# Patient Record
Sex: FEMALE | Race: WHITE | Employment: OTHER | URBAN - METROPOLITAN AREA
[De-identification: names, ages, dates, MRNs, and addresses within clinical notes are randomized per-mention and may not be internally consistent; named-entity substitution may affect disease eponyms.]

---

## 2017-07-12 ENCOUNTER — TRANSFERRED RECORDS (OUTPATIENT)
Dept: HEALTH INFORMATION MANAGEMENT | Facility: CLINIC | Age: 57
End: 2017-07-12

## 2017-08-17 ENCOUNTER — TRANSFERRED RECORDS (OUTPATIENT)
Dept: HEALTH INFORMATION MANAGEMENT | Facility: CLINIC | Age: 57
End: 2017-08-17

## 2018-01-27 ENCOUNTER — TRANSFERRED RECORDS (OUTPATIENT)
Dept: HEALTH INFORMATION MANAGEMENT | Facility: CLINIC | Age: 58
End: 2018-01-27

## 2018-04-27 ENCOUNTER — TRANSFERRED RECORDS (OUTPATIENT)
Dept: HEALTH INFORMATION MANAGEMENT | Facility: CLINIC | Age: 58
End: 2018-04-27

## 2018-05-01 ENCOUNTER — TRANSFERRED RECORDS (OUTPATIENT)
Dept: HEALTH INFORMATION MANAGEMENT | Facility: CLINIC | Age: 58
End: 2018-05-01

## 2018-05-08 ENCOUNTER — TRANSFERRED RECORDS (OUTPATIENT)
Dept: HEALTH INFORMATION MANAGEMENT | Facility: CLINIC | Age: 58
End: 2018-05-08

## 2018-05-08 NOTE — TELEPHONE ENCOUNTER
FUTURE VISIT INFORMATION      FUTURE VISIT INFORMATION:    Date: 05/16/18    Time: 830am    Location: [facility]  REFERRAL INFORMATION:    Referring provider:  KESHAWN BULLARD    Referring providers clinic: [facility]    Reason for visit/diagnosis  v/t/gtop/rnfl - Unexplained decrease vision consult    RECORDS REQUESTED FROM:       Clinic name Comments Records Status Imaging Status   [facility] Faxed Notes from the last year. [phone] Contact Number In HIM                                    RECORDS STATUS

## 2018-05-15 ENCOUNTER — HEALTH MAINTENANCE LETTER (OUTPATIENT)
Age: 58
End: 2018-05-15

## 2018-05-16 ENCOUNTER — OFFICE VISIT (OUTPATIENT)
Dept: OPHTHALMOLOGY | Facility: CLINIC | Age: 58
End: 2018-05-16
Payer: MEDICARE

## 2018-05-16 ENCOUNTER — PRE VISIT (OUTPATIENT)
Dept: OPHTHALMOLOGY | Facility: CLINIC | Age: 58
End: 2018-05-16

## 2018-05-16 DIAGNOSIS — H53.10 SUBJECTIVE VISUAL DISTURBANCE: ICD-10-CM

## 2018-05-16 DIAGNOSIS — H50.15 ALTERNATING EXOTROPIA: ICD-10-CM

## 2018-05-16 DIAGNOSIS — H53.10 SUBJECTIVE VISUAL DISTURBANCE: Primary | ICD-10-CM

## 2018-05-16 DIAGNOSIS — H53.40 VISUAL FIELD DEFECT: Primary | ICD-10-CM

## 2018-05-16 RX ORDER — ALBUTEROL SULFATE 90 UG/1
AEROSOL, METERED RESPIRATORY (INHALATION)
COMMUNITY
Start: 2017-01-01

## 2018-05-16 RX ORDER — HYDROCHLOROTHIAZIDE 25 MG/1
TABLET ORAL
COMMUNITY
Start: 2017-01-01

## 2018-05-16 RX ORDER — HYDROCODONE BITARTRATE AND ACETAMINOPHEN 10; 325 MG/1; MG/1
TABLET ORAL
COMMUNITY
Start: 2008-01-01

## 2018-05-16 RX ORDER — PRAVASTATIN SODIUM 40 MG
TABLET ORAL
COMMUNITY
Start: 2017-01-01

## 2018-05-16 RX ORDER — LAMOTRIGINE 100 MG/1
TABLET, ORALLY DISINTEGRATING ORAL
COMMUNITY
Start: 2007-01-01

## 2018-05-16 RX ORDER — LATANOPROST 50 UG/ML
SOLUTION/ DROPS OPHTHALMIC
COMMUNITY
Start: 2015-01-01

## 2018-05-16 RX ORDER — LOSARTAN POTASSIUM 100 MG/1
TABLET ORAL
COMMUNITY
Start: 2017-01-01

## 2018-05-16 RX ORDER — LEVOTHYROXINE SODIUM 200 UG/1
TABLET ORAL
COMMUNITY
Start: 1990-01-01

## 2018-05-16 RX ORDER — SERTRALINE HYDROCHLORIDE 100 MG/1
TABLET, FILM COATED ORAL
COMMUNITY
Start: 2017-11-01

## 2018-05-16 ASSESSMENT — REFRACTION_MANIFEST
OS_CYLINDER: +0.50
OS_SPHERE: -1.00
OS_AXIS: 035
OD_SPHERE: -1.00
OD_CYLINDER: SPHERE

## 2018-05-16 ASSESSMENT — EXTERNAL EXAM - LEFT EYE: OS_EXAM: NORMAL

## 2018-05-16 ASSESSMENT — REFRACTION_WEARINGRX
OD_CYLINDER: +0.50
OS_AXIS: 035
OS_CYLINDER: +0.50
OD_ADD: +2.50
SPECS_TYPE: BIFOCAL
OD_AXIS: 075
OS_SPHERE: -1.50
OS_ADD: +2.50
OD_SPHERE: -1.25

## 2018-05-16 ASSESSMENT — VISUAL ACUITY
OD_CC: J5
CORRECTION_TYPE: GLASSES
METHOD: SNELLEN - LINEAR
OS_PH_CC: 20/50
OD_CC: 20/50
OS_CC: 20/50
OD_CC+: -2
OS_CC: J5
OS_CC+: -1
OD_PH_CC: 20/50-1

## 2018-05-16 ASSESSMENT — EXTERNAL EXAM - RIGHT EYE: OD_EXAM: NORMAL

## 2018-05-16 ASSESSMENT — TONOMETRY
OD_IOP_MMHG: 18
IOP_METHOD: ICARE
OS_IOP_MMHG: 17

## 2018-05-16 ASSESSMENT — CUP TO DISC RATIO
OS_RATIO: 0.6
OD_RATIO: 0.6

## 2018-05-16 ASSESSMENT — SLIT LAMP EXAM - LIDS
COMMENTS: NORMAL
COMMENTS: NORMAL

## 2018-05-16 ASSESSMENT — CONF VISUAL FIELD
OS_NORMAL: 1
OD_NORMAL: 1
METHOD: COUNTING FINGERS

## 2018-05-16 ASSESSMENT — REFRACTION_FINALRX
OD_HPRISM: 6BI
OS_HPRISM: 6BI

## 2018-05-16 NOTE — PROGRESS NOTES
Assessment & Plan     Linsey Mcdaniels is a 58 year old female with the following diagnoses:   1. Visual field defect    2. Subjective visual disturbance    3. Alternating exotropia         Ms. Mcdaniels is a 58 year old with a 6 month history of blurry vision in both eyes with difficulty focusing.    She has had binocular horizontal diplopia and blurry vision in both eyes for the past 6 months.  She was not given any new glasses, and had a suggestion of some visual field constriction in both eyes.  No new head trauma.    She has had prism in her glasses for the past two years.  She finds her prism glasses are no longer working for her.  She denies headaches, but has significant fatigue over the past few months.  She has had numbness in the right side of her face for the past few months following a mild whiplash trauma.  She has had significant blood pressure fluctuations over the past few months.    In 2015, she had fallen and was down for 7 hours and hospitalized for rhabdomyolysis.  She has a history of Klippel-Feil syndrome as well as generalized ataxia.  She also has a history of spina bifida occulta, as well as spinal cord compression.  She also has type 2 diabetes mellitus, asthma, FACUNDO, long QT syndrome, fibromyalgia, hypothyroidism and gastric ulcers.    She has a history of open angle glaucoma and takes latanoprost drops.  She has a history of intermittent exotropia.    Her MRI revealed multilevel cervical spondylosis with no active cord compression with normal appearance of the trigeminal nerve bilaterally and essentially a normal MRI brain aside from punctate nonspecific white matter T2 gliosis at the left superior frontal gyrus.    On examination, her vision is best corrected to 20/30 in the right and 20/25 in the left with no relative afferent pupillary defect.  Her intraocular pressures were 18 in the right and 17 in the left.  She has an early Posterior subcapsular cataract (PSC) in the right  eye and nuclear sclerosis bilaterally.  She has no TIDs.  She has some mild cupping (0.6 and 0.5) in the right and left eye respectively.  She has very slow saccades and saccadic pursuit with some saccadic intrusions.  She has an exotropia worse at near than at distance suggesting convergence insufficiency.    Her visual fields show peripheral constriction in both eyes with a normal central island.  Her OCT is unremarkable apart from some mild thinning temporally in the left eye.  She does, however, have normal visual fields to confrontation.    It is my impression that Ms. 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  I do not think her visual fields constriction in both eyes is related to a glaucomatous or neurologic process given the unremarkable optical coherence tomography and normal confrontational visual fields.  She is also not symptomatic of visual field constriction.  Could consider adding more prism to glasses or a surgical procedure. Patient would like to increase the prism.  Follow up 1 year sooner as needed for worsening symptoms.           Attending Physician Attestation:  Complete documentation of historical and exam elements from today's encounter can be found in the full encounter summary report (not reduplicated in this progress note).  I personally obtained the chief complaint(s) and history of present illness.  I confirmed and edited as necessary the review of systems, past medical/surgical history, family history, social history, and examination findings as documented by others; and I examined the patient myself.  I personally reviewed the relevant tests, images, and reports as documented above.  I formulated and edited as necessary the assessment and plan and discussed the findings and management plan with the patient and family. - Tyrell Mccoy MD

## 2018-05-16 NOTE — LETTER
2018         RE:  :  MRN: Linsey Mcdaniels  1960  [id number]     Dear Dr. Parekh,    Thank you for asking me to see your very pleasant patient, Linsey Mcdaniels, in neuro-ophthalmic consultation.  I would like to thank you for sending your records and I have summarized them in the history of present illness.   My assessment and plan are below.  For further details, please see my attached clinic note.          Assessment & Plan     Linsey Mcdaniels is a 58 year old female with the following diagnoses:   1. Visual field defect    2. Subjective visual disturbance    3. Alternating exotropia         Ms. Mcdaniels is a 58 year old with a 6 month history of blurry vision in both eyes with difficulty focusing.    She has had binocular horizontal diplopia and blurry vision in both eyes for the past 6 months.  She was not given any new glasses, and had a suggestion of some visual field constriction in both eyes.  No new head trauma.    She has had prism in her glasses for the past two years.  She finds her prism glasses are no longer working for her.  She denies headaches, but has significant fatigue over the past few months.  She has had numbness in the right side of her face for the past few months following a mild whiplash trauma.  She has had significant blood pressure fluctuations over the past few months.    In , she had fallen and was down for 7 hours and hospitalized for rhabdomyolysis.  She has a history of Klippel-Feil syndrome as well as generalized ataxia.  She also has a history of spina bifida occulta, as well as spinal cord compression.  She also has type 2 diabetes mellitus, asthma, FACUNDO, long QT syndrome, fibromyalgia, hypothyroidism and gastric ulcers.    She has a history of open angle glaucoma and takes latanoprost drops.  She has a history of intermittent exotropia.    Her MRI revealed multilevel cervical spondylosis with no active cord compression with normal appearance of the trigeminal  nerve bilaterally and essentially a normal MRI brain aside from punctate nonspecific white matter T2 gliosis at the left superior frontal gyrus.    On examination, her vision is best corrected to 20/30 in the right and 20/25 in the left with no relative afferent pupillary defect.  Her intraocular pressures were 18 in the right and 17 in the left.  She has an early Posterior subcapsular cataract (PSC) in the right eye and nuclear sclerosis bilaterally.  She has no TIDs.  She has some mild cupping (0.6 and 0.5) in the right and left eye respectively.  She has very slow saccades and saccadic pursuit with some saccadic intrusions.  She has an exotropia worse at near than at distance suggesting convergence insufficiency.    Her visual fields show peripheral constriction in both eyes with a normal central island.  Her OCT is unremarkable apart from some mild thinning temporally in the left eye.  She does, however, have normal visual fields to confrontation.    It is my impression that Ms. 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  I do not think her visual fields constriction in both eyes is related to a glaucomatous or neurologic process given the unremarkable optical coherence tomography and normal confrontational visual fields.  She is also not symptomatic of visual field constriction.  Could consider adding more prism to glasses or a surgical procedure. Patient would like to increase the prism.  Follow up 1 year sooner as needed for worsening symptoms.          Again, thank you for allowing me to participate in the care of your patient.      Sincerely,    Tyrell Mccoy MD  Professor, Neuro-Ophthalmology  Department of Ophthalmology and Visual Neurosciences  [facility]    CC: Jt Parekh OD  [facility]  [address] [address]  VIA Facsimile:  [phone]     Michelle Sherwood MD  [facility] [address]  [place] [address]  VIA Facsimile: [phone]

## 2018-05-16 NOTE — MR AVS SNAPSHOT
After Visit Summary   5/16/2018    Linsey Mcdaniels    MRN: [id number]           Patient Information     Date Of Birth          1960        Visit Information        Provider Department      5/16/2018 8:30 AM Tyrell Mccoy MD [facility]        Today's Diagnoses     Visual field defect    -  1    Subjective visual disturbance        Alternating exotropia           Follow-ups after your visit        Who to contact     Please call your clinic at [phone] to:    Ask questions about your health    Make or cancel appointments    Discuss your medicines    Learn about your test results    Speak to your doctor            Additional Information About Your Visit        MyChart Information     [employer] gives you secure access to your electronic health record. If you see a primary care provider, you can also send messages to your care team and make appointments. If you have questions, please call your primary care clinic.  If you do not have a primary care provider, please call [phone] and they will assist you.      [employer] is an electronic gateway that provides easy, online access to your medical records. With [employer], you can request a clinic appointment, read your test results, renew a prescription or communicate with your care team.     To access your existing account, please contact your [facility] or call [phone] for assistance.        Care EveryWhere ID     This is your Care EveryWhere ID. This could be used by other organizations to access your [place] medical records  [id number]         Blood Pressure from Last 3 Encounters:   No data found for BP    Weight from Last 3 Encounters:   No data found for Wt              We Performed the Following     Color Vision - Screening OU (both eyes)     Glaucoma Top OU     IOP Measurement     OCT Optic Nerve RNFL Spectralis OU (both eyes)     Sensorimotor       Information about OPIOIDS      PRESCRIPTION OPIOIDS: WHAT YOU NEED TO KNOW   You have a prescription for an opioid (narcotic) pain medicine. Opioids can cause addiction. If you have a history of chemical dependency of any type, you are at a higher risk of becoming addicted to opioids. Only take this medicine after all other options have been tried. Take it for as short a time and as few doses as possible.     Do not:    Drive. If you drive while taking these medicines, you could be arrested for driving under the influence (DUI).    Operate heavy machinery    Do any other dangerous activities while taking these medicines.     Drink any alcohol while taking these medicines.      Take with any other medicines that contain acetaminophen. Read all labels carefully. Look for the word  acetaminophen  or  Tylenol.  Ask your pharmacist if you have questions or are unsure.    Store your pills in a secure place, locked if possible. We will not replace any lost or stolen medicine. If you don t finish your medicine, please throw away (dispose) as directed by your pharmacist. The [place] Pollution Control Agency has more information about safe disposal: https://www.pca.[facility].[place].us/living-green/managing-unwanted-medications    All opioids tend to cause constipation. Drink plenty of water and eat foods that have a lot of fiber, such as fruits, vegetables, prune juice, apple juice and high-fiber cereal. Take a laxative (Miralax, milk of magnesia, Colace, Senna) if you don t move your bowels at least every other day.          Primary Care Provider Office Phone # Fax #    Michelle Sherwood [phone]       [facility] [address]  [facility] [address]        Equal Access to Services     [facility]: Hadii aad edgar hadasho Soomaali, waaxda luqadaha, qaybta kaalmada adejoann canchola . So [facility] [phone].    ATENCIÓN: Si habla español, tiene a gore disposición servicios gratuitos de  asistencia lingüística. Krystle al [phone].    We comply with applicable federal civil rights laws and [place] laws. We do not discriminate on the basis of race, color, national origin, age, disability, sex, sexual orientation, or gender identity.            Thank you!     Thank you for choosing [facility]  for your care. Our goal is always to provide you with excellent care. Hearing back from our patients is one way we can continue to improve our services. Please take a few minutes to complete the written survey that you may receive in the mail after your visit with us. Thank you!             Your Updated Medication List - Protect others around you: Learn how to safely use, store and throw away your medicines at www.disposemymeds.org.          This list is accurate as of 5/16/18 10:14 AM.  Always use your most recent med list.                   Brand Name Dispense Instructions for use Diagnosis    CYCLOBENZAPRINE COMFORT PAC 10 MG Kit           hydrochlorothiazide 25 MG tablet    HYDRODIURIL          HYDROcodone-acetaminophen  MG per tablet    NORCO          lamoTRIgine 100 MG Tbdp ODT tab    LaMICtal          latanoprost 0.005 % ophthalmic solution    XALATAN          levothyroxine 200 MCG tablet    SYNTHROID/LEVOTHROID          losartan 100 MG tablet    COZAAR          omeprazole 20 MG CR capsule    priLOSEC          pravastatin 40 MG tablet    PRAVACHOL          ranitidine 150 MG tablet    ZANTAC          sertraline 100 MG tablet    ZOLOFT          VENTOLIN  (90 Base) MCG/ACT Inhaler   Generic drug:  albuterol

## 2018-05-16 NOTE — NURSING NOTE
Chief Complaints and History of Present Illnesses   Patient presents with     Consult For     unexplained decreased vision     HPI    Affected eye(s):  Both   Symptoms:     Blurred vision   Decreased vision      Frequency:  Constant       Do you have eye pain now?:  No      Comments:  Patient states has constant blurry vision with or without glasses- started in November/December 2017. Pt feels like her eyes roll back too.   Latanoprost QHS OU.    Everton Gonzalez COT 7:43 AM May 16, 2018

## 2019-10-04 ENCOUNTER — HEALTH MAINTENANCE LETTER (OUTPATIENT)
Age: 59
End: 2019-10-04

## 2019-11-08 ENCOUNTER — MEDICAL CORRESPONDENCE (OUTPATIENT)
Dept: HEALTH INFORMATION MANAGEMENT | Facility: CLINIC | Age: 59
End: 2019-11-08

## 2019-11-08 ENCOUNTER — TRANSFERRED RECORDS (OUTPATIENT)
Dept: HEALTH INFORMATION MANAGEMENT | Facility: CLINIC | Age: 59
End: 2019-11-08

## 2019-12-10 ENCOUNTER — OFFICE VISIT (OUTPATIENT)
Dept: OPHTHALMOLOGY | Facility: CLINIC | Age: 59
End: 2019-12-10
Attending: OPHTHALMOLOGY
Payer: MEDICARE

## 2019-12-10 DIAGNOSIS — H53.2 DOUBLE VISION: ICD-10-CM

## 2019-12-10 DIAGNOSIS — H50.05 ESOTROPIA, ALTERNATING: ICD-10-CM

## 2019-12-10 DIAGNOSIS — H53.10 SUBJECTIVE VISUAL DISTURBANCE: ICD-10-CM

## 2019-12-10 DIAGNOSIS — H53.2 DOUBLE VISION: Primary | ICD-10-CM

## 2019-12-10 PROCEDURE — 92060 SENSORIMOTOR EXAMINATION: CPT | Mod: ZF | Performed by: OPHTHALMOLOGY

## 2019-12-10 PROCEDURE — 36415 COLL VENOUS BLD VENIPUNCTURE: CPT | Performed by: OPHTHALMOLOGY

## 2019-12-10 PROCEDURE — 84425 ASSAY OF VITAMIN B-1: CPT | Performed by: OPHTHALMOLOGY

## 2019-12-10 PROCEDURE — 83519 RIA NONANTIBODY: CPT | Performed by: OPHTHALMOLOGY

## 2019-12-10 PROCEDURE — G0463 HOSPITAL OUTPT CLINIC VISIT: HCPCS | Mod: 25,ZF

## 2019-12-10 ASSESSMENT — CUP TO DISC RATIO
OS_RATIO: 0.6
OD_RATIO: 0.5

## 2019-12-10 ASSESSMENT — VISUAL ACUITY
OS_CC: 20/40
METHOD: SNELLEN - LINEAR
OD_CC: 20/40
CORRECTION_TYPE: GLASSES
OD_CC+: +2

## 2019-12-10 ASSESSMENT — REFRACTION_WEARINGRX
OD_SPHERE: -1.00
OD_ADD: +2.50
OS_SPHERE: -1.00
OD_AXIS: 075
OD_HPRISM: 6 BI
OD_CYLINDER: +0.50
OS_AXIS: 030
OS_ADD: +2.50
OS_CYLINDER: +0.75
OS_HPRISM: 6 BI
SPECS_TYPE: BIFOCAL

## 2019-12-10 ASSESSMENT — CONF VISUAL FIELD
OS_INFERIOR_NASAL_RESTRICTION: 3
OD_NORMAL: 1
METHOD: COUNTING FINGERS
OS_INFERIOR_TEMPORAL_RESTRICTION: 3

## 2019-12-10 ASSESSMENT — TONOMETRY
OS_IOP_MMHG: 14
IOP_METHOD: ICARE-AS
OD_IOP_MMHG: 14

## 2019-12-10 ASSESSMENT — EXTERNAL EXAM - LEFT EYE: OS_EXAM: NORMAL

## 2019-12-10 ASSESSMENT — EXTERNAL EXAM - RIGHT EYE: OD_EXAM: NORMAL

## 2019-12-10 ASSESSMENT — SLIT LAMP EXAM - LIDS
COMMENTS: NORMAL
COMMENTS: NORMAL

## 2019-12-10 NOTE — PROGRESS NOTES
1. 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  Will proceed with work-up to prove diagnosis before initiating treatment.      2. Open angle glaucoma- treatment as per Dr. Parekh.    Patient is a very pleasant 59 year old female sent by Dr. Parekh for consultation of possible myasthenia gravis.     Today's history was facilitated by the patient's daughter as well as a review of the medical record here at the [facility] including an office visit with Dr. Tyrell Mccoy in May 2018.  At that time the patient was evaluated for intermittent binocular diplopia for the past 6 months.  She had a history of wearing prism in her glasses for 2 years prior to that visit.  At the visit with Dr. Mccoy she described difficulty with double vision despite her prism glasses which were no longer working for her.  Automated visual fields showed peripheral constriction in both eyes.  These findings were not appreciated on the confrontation testing.    Sensorimotor exam performed in May 2018 demonstrated an exotropia of 12 prism diopters in primary gaze that did decreased in right and left gaze to around 6-7.  And downgaze it measured 12 prism diopters in upgaze 20 prism diopters.  At near there was a 30 prism diopter exotropia.    Today the patient reports that over the past 6 months she feels like her prism glasses are again no longer working for her.  The patient's daughter notices at least several years of intermittent droopy eyelid.  This is typically bilateral.  In the past year she is also noted difficulty with swallowing that she believes has gotten worse.  She has times where she feels like food gets stuck and she is unable to swallow.    Past medical history is significant for Kippel Feil syndrome, ataxia, gastric bypass with complications in 2007.  She had a bleeding peptic ulcer in 2010 status post blood transfusions.    She is being treated for glaucoma and has had cataract  surgeyr    Visual acuity is 20/40 in both eyes with present correction. Intraocular pressure is normal in both eyes. Pupils normal without afferent pupillary defect. Color plates 10/11 in the right eye and 9/11 in the left eye. Confrontational visual fields show inferior defect in the left eye. Motility variable throughout examination today. No fatigue with upgaze testing. Slit lamp exam with mild cataract in both eyes. Dilated fundus exam with mild cupping in both eyes.     It is my impression that Linsey has variable eye alignment. I will obtain lab testing for myasthenia gravis and also thiamine testing. 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 Check acetylcholine receptor binding antibody.  If negative then MUSK and LRP4 antibody. If negative then single fiber electromyography testing.  Will check thiamine level also as thiamine deficiency could present in this fashion though this would be much less likely.  Recommend start b complex twice daily and multivitamin twice daily.    Primary care physician is Idalia Allen [facility]  [place], [place]     I spent a total of 60 minutes face to face with Linsey Mcdaniels during today's office visit.  Over 50% of this time was spent counseling the patient and/or coordinating care regarding her variable strabismus and possible myasthenia gravis.    Complete documentation of historical and exam elements from today's encounter can be found in the full encounter summary report (not reduplicated in this progress note).  I personally obtained the chief complaint(s) and history of present illness.  I confirmed and edited as necessary the review of systems, past medical/surgical history, family history, social history, and examination findings as documented by others; and I examined the patient myself.  I personally reviewed the relevant tests, images, and  reports as documented above.  I formulated and edited as necessary the assessment and plan and discussed the findings and management plan with the patient and family     Noe Gonzales MD

## 2019-12-10 NOTE — LETTER
December 10, 2019    RE: Linsey Mcdaniels  : 1960  MRN: [id number]    Dear Dr. Parekh,    Thank you for referring your patient, Linsey Mcdaniels, to my neuro-ophthalmology clinic recently.  After a thorough neuro-ophthalmic history and examination, I came to the following conclusions:   1. 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  Will proceed with work-up to prove diagnosis before initiating treatment.      2. Open angle glaucoma- treatment as per Dr. Parekh.    Patient is a very pleasant 59 year old female sent by Dr. Parekh for consultation of possible myasthenia gravis.     Today's history was facilitated by the patient's daughter as well as a review of the medical record here at the [facility] including an office visit with Dr. Tyrell Mccoy in May 2018.  At that time the patient was evaluated for intermittent binocular diplopia for the past 6 months.  She had a history of wearing prism in her glasses for 2 years prior to that visit.  At the visit with Dr. Mccoy she described difficulty with double vision despite her prism glasses which were no longer working for her.  Automated visual fields showed peripheral constriction in both eyes.  These findings were not appreciated on the confrontation testing.    Sensorimotor exam performed in May 2018 demonstrated an exotropia of 12 prism diopters in primary gaze that did decreased in right and left gaze to around 6-7.  And downgaze it measured 12 prism diopters in upgaze 20 prism diopters.  At near there was a 30 prism diopter exotropia.    Today the patient reports that over the past 6 months she feels like her prism glasses are again no longer working for her.  The patient's daughter notices at least several years of intermittent droopy eyelid.  This is typically bilateral.  In the past year she is also noted difficulty with swallowing that she believes has gotten worse.  She has times where she feels like  food gets stuck and she is unable to swallow.    Past medical history is significant for Kippel Feil syndrome, ataxia, gastric bypass with complications in 2007.  She had a bleeding peptic ulcer in 2010 status post blood transfusions.    She is being treated for glaucoma and has had cataract surgeyr    Visual acuity is 20/40 in both eyes with present correction. Intraocular pressure is normal in both eyes. Pupils normal without afferent pupillary defect. Color plates 10/11 in the right eye and 9/11 in the left eye. Confrontational visual fields show inferior defect in the left eye. Motility variable throughout examination today. No fatigue with upgaze testing. Slit lamp exam with mild cataract in both eyes. Dilated fundus exam with mild cupping in both eyes.     It is my impression that Linsey has variable eye alignment. I will obtain lab testing for myasthenia gravis and also thiamine testing. 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 Check acetylcholine receptor binding antibody.  If negative then MUSK and LRP4 antibody. If negative then single fiber electromyography testing.  Will check thiamine level also as thiamine deficiency could present in this fashion though this would be much less likely.  Recommend start b complex twice daily and multivitamin twice daily.    Primary care physician is Idalia Allen [facility]  [place], [place]    Again, thank you for trusting me with the care of your patient.  For further exam details, please feel free to contact our office for additional records.  If you wish to contact me regarding this patient please email me at [email or web address] or give my clinic a call to arrange a phone conversation.    Sincerely,    Noe Gonzales MD  , Neuro-Ophthalmology and Adult Strabismus Surgery  The Kenny Higgins Chair in  Neuro-Ophthalmology  Department of Ophthalmology and Visual Neurosciences  [facility]    DX: possible myasthenia gravis

## 2019-12-10 NOTE — NURSING NOTE
Chief Complaint(s) and History of Present Illness(es)     Myasthenia Gravis               Comments     Inf: Patient and daughter. Referred from Dr. Parekh at [facility] for MG. She also has glaucoma (takes latanoprost QPM), diabetes, hypothyroidism, and cataracts. For the past 6 months she has had double vision and has 12 BI in her glasses (started with 6BI, but didn't help until 12). Helped for first 2 months and now double vision is the same with or without glasses. When diplopia occurs, images are side by side and slanted. No pain, redness, or irritation. Eyes drift out all the time, worse without gls. Has trouble seeing in dn and nr. Squints frequently. Tilts head to the left, double vision improves.  Was told previously she has symptoms of bells palsy (left side of face droops). Has had many MRIs and surgeries. Most recent CT at [facility] in [place], had MRI several years ago. May have had recent blood work with TSH, but unsure what other lab work was done.   Daughter has noticed for a few years that her lids become droopy like she is sleeping.

## 2019-12-12 LAB — ACHR BIND AB SER-SCNC: 0 NMOL/L (ref 0–0.4)

## 2019-12-13 LAB — VIT B1 BLD-MCNC: 93 NMOL/L (ref 70–180)

## 2019-12-17 ENCOUNTER — TELEPHONE (OUTPATIENT)
Dept: OPHTHALMOLOGY | Facility: CLINIC | Age: 59
End: 2019-12-17

## 2019-12-17 NOTE — TELEPHONE ENCOUNTER
----- Message from Noe Gonzales MD sent at 12/15/2019  1:51 PM CST -----  Regarding: Please call Daughter or Patient  Patricia,    Please call daughter or patient and tell her that her labs for myasthenia gravis came back negative but that I remain very suspicious for myasthenia gravis.  The first test I ordered only catches about 50% of myasthenia gravis cases.  We will run two additional lab tests for myasthenia gravis.  These can take up to 4 weeks to come back.  Once these come back if normal then we will order single fiber electromyography.  If these other tests come back abnormal then we will start treatment for myasthenia gravis.    Thank you. - Noe

## 2019-12-18 NOTE — TELEPHONE ENCOUNTER
12/17/2019 03:43 PM Phone (Outgoing) Linsey Mcdaniels (Self) [phone] (H)    Left Message - called and left message with MD message and call back # -e m       By Nuha Orellana      12/17/2019 03:45 PM Phone (Outgoing) JAREK,SANDRO (EC) [phone]    Left Message - left message for patient's daughter with info about labs and call back #      By Nuha Orellana      12/18/2019 09:23 AM Phone (Incoming) Linsey Mcdaniels (Self) [phone] (H)    Patient called and requested we fax lab orders to facility. Provided contact # for Wesly at facility who states she will assist with lab coordination; ph: [phone]; fax: [phone]      By Nuha Orellana      12/18/2019 10:34 AM Phone (Outgoing) Linsey Mcdaniels (Self) [phone] (H)    Talked with Patient - Let Linsey know that I faxed lab orders but that I was still doubtful her home care lab facility would be able to coordinate send outs. Requested that if they expressed concerns that she NOT proceed with home draw & instead go to [facility] [place]. Patient expressed understanding -em 10:35 AM December 18, 2019

## 2020-01-10 DIAGNOSIS — H53.2 DOUBLE VISION: ICD-10-CM

## 2020-01-10 LAB — MISCELLANEOUS TEST: NORMAL

## 2020-01-10 PROCEDURE — 99000 SPECIMEN HANDLING OFFICE-LAB: CPT | Performed by: OPHTHALMOLOGY

## 2020-01-10 PROCEDURE — 36415 COLL VENOUS BLD VENIPUNCTURE: CPT | Performed by: OPHTHALMOLOGY

## 2020-01-10 PROCEDURE — 83519 RIA NONANTIBODY: CPT | Mod: 90 | Performed by: OPHTHALMOLOGY

## 2020-01-13 LAB — MUSCLE SPECIFIC KINASE (MUSK) ABY IGG: 0 NMOL/L (ref 0–0.03)

## 2020-01-17 ENCOUNTER — TELEPHONE (OUTPATIENT)
Dept: OPHTHALMOLOGY | Facility: CLINIC | Age: 60
End: 2020-01-17

## 2020-01-17 NOTE — TELEPHONE ENCOUNTER
"Conveyed MD message to patient and daughter      \"Please call this patient (or daughter) and tell her that the normal blood test does not prove she does not have myasthenia gravis because many patients with myasthenia gravis will have a normal blood test.  I would like now to refer her to Dr. Ferrara for single fiber electromyography at [facility].  I told her this was the plan before if we could  not prove her diagnosis with a blood test.  There is still one blood test pending but probably better to get the single fiber electromyography scheduled.  I suspect the last blood test will come back normal also. \"  "

## 2020-01-20 LAB — LAB SCANNED RESULT: NORMAL

## 2020-01-21 DIAGNOSIS — H53.2 DOUBLE VISION: Primary | ICD-10-CM

## 2020-01-21 DIAGNOSIS — H50.9 STRABISMUS: ICD-10-CM

## 2020-02-08 ENCOUNTER — HEALTH MAINTENANCE LETTER (OUTPATIENT)
Age: 60
End: 2020-02-08

## 2020-02-13 ENCOUNTER — TELEPHONE (OUTPATIENT)
Dept: OPHTHALMOLOGY | Facility: CLINIC | Age: 60
End: 2020-02-13

## 2020-02-13 NOTE — TELEPHONE ENCOUNTER
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 Livingston to review swallowing difficulties and assist in referral to neurologist he previously recommended    Reviewed would ask Dr. Livingston/facilitator to assist and contact pt after complete    Reviewed to f/u with PCP/ENT on swallow study recommendations    Roman Merritt RN 10:48 AM 02/13/20          [facility] Call Center    Phone Message    May a detailed message be left on voicemail: yes     Reason for Call: Symptoms or Concerns     If patient has red-flag symptoms, warm transfer to triage line    Current symptom or concern: Patient is calling to discuss her new symptoms she is experiencing in regards to difficulting swallowing that has progressed. Patient would like to discuss this in more detail with his nurse Nuha. Please advise.         Symptoms have been present   Action Taken: Message routed to:  Other: Eye Clinic    Travel Screening: Not Applicable

## 2020-02-14 NOTE — TELEPHONE ENCOUNTER
Called patient to discuss trouble swallowing. She reports that she has gradually had increased trouble with solid foods.  She reports being seen by an ENT physician at her living facility and she will make an appointment to see another ENT with a swallow study.  This is what her ENT physician that saw her recommended.  I informed her that her diplopia has been going on for years and while I am suspicious for myasthenia gravis, she does not yet have a confirmed diagnosis.  I recommended she follow through with her ENT evaluation and swallow study.  I recommended she keep her single fiber electromyography appointment in March and that she see a cardiologist to get approval for mestinon 60 mg three times a day which would be my first line treatment for her (patient has extensive cardiac history but she was not clear what exactly was her diagnosis).  Prednisone would carry significant risk for this patient and I am reluctant to start it without a confirmed diagnosis of myasthenia gravis. I am hopeful that her single fiber electromyography will allow us to either diagnosis or rule-out myasthenia gravis.    She will keep her follow-up with me 3/3/20. Advised her that if she was unable to drink smoothies / liquids  then she must go to the [facility] emergency department for evaluation.

## 2020-02-17 ENCOUNTER — MEDICAL CORRESPONDENCE (OUTPATIENT)
Dept: HEALTH INFORMATION MANAGEMENT | Facility: CLINIC | Age: 60
End: 2020-02-17

## 2020-02-19 DIAGNOSIS — I49.9 CARDIAC ARRHYTHMIA: Primary | ICD-10-CM

## 2020-03-03 ENCOUNTER — OFFICE VISIT (OUTPATIENT)
Dept: OPHTHALMOLOGY | Facility: CLINIC | Age: 60
End: 2020-03-03
Attending: OPHTHALMOLOGY
Payer: MEDICARE

## 2020-03-03 DIAGNOSIS — H53.2 DOUBLE VISION: ICD-10-CM

## 2020-03-03 DIAGNOSIS — H53.10 SUBJECTIVE VISUAL DISTURBANCE: ICD-10-CM

## 2020-03-03 PROCEDURE — G0463 HOSPITAL OUTPT CLINIC VISIT: HCPCS | Mod: 25,ZF

## 2020-03-03 PROCEDURE — 92060 SENSORIMOTOR EXAMINATION: CPT | Mod: ZF | Performed by: OPHTHALMOLOGY

## 2020-03-03 ASSESSMENT — SLIT LAMP EXAM - LIDS
COMMENTS: NORMAL
COMMENTS: NORMAL

## 2020-03-03 ASSESSMENT — VISUAL ACUITY
OD_CC+: +2
CORRECTION_TYPE: GLASSES
METHOD: SNELLEN - LINEAR
OD_CC: 20/40
OS_CC: 20/30
OS_CC+: -2

## 2020-03-03 ASSESSMENT — REFRACTION_WEARINGRX
OD_SPHERE: -1.00
OD_HPRISM: 6 BI
OD_ADD: +2.50
OD_CYLINDER: +0.50
OS_HPRISM: 6 BI
SPECS_TYPE: BIFOCAL
OS_ADD: +2.50
OS_CYLINDER: +0.75
OS_SPHERE: -1.00
OD_AXIS: 075
OS_AXIS: 030

## 2020-03-03 ASSESSMENT — CONF VISUAL FIELD
OS_INFERIOR_TEMPORAL_RESTRICTION: 3
OS_INFERIOR_NASAL_RESTRICTION: 3
OD_NORMAL: 1
METHOD: COUNTING FINGERS

## 2020-03-03 ASSESSMENT — CUP TO DISC RATIO
OS_RATIO: 0.6
OD_RATIO: 0.5

## 2020-03-03 ASSESSMENT — TONOMETRY
OS_IOP_MMHG: 12
OD_IOP_MMHG: 14

## 2020-03-03 ASSESSMENT — EXTERNAL EXAM - RIGHT EYE: OD_EXAM: NORMAL

## 2020-03-03 ASSESSMENT — EXTERNAL EXAM - LEFT EYE: OS_EXAM: NORMAL

## 2020-03-03 NOTE — PROGRESS NOTES
1. 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  Negative acetylcholine receptor binding antibody, MuSK antibody, LRP4, and thiamine. She is having worsening dysphagia with a choking episode that the patient reports as being nearly life threatening. Recommend staying on liquid diet given choking episode. No evidence of active myasthenic crisis requiring hospitalization. Scheduled for sfEMG on 3/24/2020. ENT appointment 3/12/2020. Cardiology appointment 3/19/2020. If sfEMG positive then will start mestinon and prednisone with referral to neuromuscular specialist.    2. Open angle glaucoma- treatment as per Dr. Parekh.    Patient is a very pleasant 60 year old female here for follow up of suspected ocular myasthenia gravis.     Historical Data:    Sensorimotor exam performed in May 2018 demonstrated an exotropia of 12 prism diopters in primary gaze that did decreased in right and left gaze to around 6-7.  And downgaze it measured 12 prism diopters in upgaze 20 prism diopters.  At near there was a 30 prism diopter exotropia.    Past medical history is significant for Kippel Feil syndrome, ataxia, gastric bypass with complications in 2007, and Type 2 Diabetes diet controlled. She had a bleeding peptic ulcer in 2010 status post blood transfusions.    She is being treated for glaucoma and has had cataract surgery.    Interval History:    Today the patient reports that over the past 3 months since last visit 12/10/2019 she notes more blurry vision and binocular diagonal diplopia. She also notes worsening ptosis of both eyes requiring chin up head position and frontalis activation. She notes worsening of swallowing since the last visit and nearly choked on ham and bean soup. She reports that she is more short of breath as well. She drinks with a straw due to neck issues and has not had difficulty using the straw.    Visual acuity is 20/40 right eye and 20/30 in the left eye with  present correction. Intraocular pressure is normal in both eyes. Pupils normal without afferent pupillary defect. Color plates 11/11 in the right eye and 8/11 in the left eye. Confrontational visual fields show inferior defect in the left eye. Motility variable throughout examination today. Fatigue with upgaze testing, bilaterally weak orbicularis function. Weak flexion and extension of the neck; greater with extension. Slit lamp exam with mild cataract in both eyes.     It is my impression that Linsey has variable eye alignment secondary to myasthenia gravis. Her worsening swallowing is concerning for bulbar involvement. Lab testing negative to date. Scheduled for single fiber EMG given clinical suspicion. She is scheduled to see her cardiologist in preparation of possibly starting mestinon. Discussed staying on liquid diet and avoiding solid foods until her work up is completed as an outpatient. Discussed that if she has an episode of choking or increased difficulty breathing that she should seek emergency medical attention for possible myasthenic crisis. Follow up 2 months.     Complete documentation of historical and exam elements from today's encounter can be found in the full encounter summary report (not reduplicated in this progress note).  I personally obtained the chief complaint(s) and history of present illness.  I confirmed and edited as necessary the review of systems, past medical/surgical history, family history, social history, and examination findings as documented by others; and I examined the patient myself.  I personally reviewed the relevant tests, images, and reports as documented above.  I formulated and edited as necessary the assessment and plan and discussed the findings and management plan with the patient and family.  I personally reviewed the ophthalmic test(s) associated with this encounter, agree with the interpretation(s) as documented by the resident/fellow, and have edited the  corresponding report(s) as necessary.     MD Tyrell Doherty MD  Neuro-Ophthalmology Fellow

## 2020-03-03 NOTE — NURSING NOTE
Chief Complaint(s) and History of Present Illness(es)     Myasthenia Gravis               Comments     Inf: mom and patient. Distance vision blurry, but near vision is ok. Diplopia occurs several times a day, very variable. Usually occurs when trying to focus on something. Images are  at a slant. Has had trouble swallowing and eating since last visit. Unable to eat anything but soups. Lids feel heavier than LV. Feels like she favors the left side. No redness, tearing, or irritation. No photophobia.     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 She also has a history of spina bifida occulta, as well as spinal cord compression. She also has type 2 diabetes mellitus, asthma, FACUNDO, long QT syndrome, fibromyalgia, hypothyroidism and gastric ulcers.

## 2020-05-12 ENCOUNTER — TELEPHONE (OUTPATIENT)
Dept: OPHTHALMOLOGY | Facility: CLINIC | Age: 60
End: 2020-05-12

## 2020-05-15 ENCOUNTER — TELEPHONE (OUTPATIENT)
Dept: OPHTHALMOLOGY | Facility: CLINIC | Age: 60
End: 2020-05-15

## 2020-05-15 NOTE — TELEPHONE ENCOUNTER
I have given all the information you sent me to my Nurses here at [facility].  We will need them to help us set up a video visit with Dr Gonzales.   I want you to be able to contact my nurses at any time.  I want them to know what is going on.  My Nurses have educated themselves about MG.  They knew nothing about it and want to help me/us with this.  I also want my Health Care Provider to be in the loop.  [facility] [facility]:     Nurse Dora [phone]    [place] [phone]  Called and spoke with CUBA. She states patient has been ill (no eye symptoms, mostly GI). But Stable eye symptoms. No complaints of eye pain or double vision that she recalls. She will chat with patient (patient had mentioned it would be a video visit) explained that my instructions stated telephone was sufficient so if patient wanted to do video we could but otherwise telephone only was OK too. They will let me know if she is wanting video instead of telephone only. Provided direct line for follow up -em 11:00 AM May 15, 2020    [facility]  Health Care Provider:     Idalia Allen [phone]  Linsey Allen's assistant  Left voicemail to confirm they have receive records and requesting contact info/fax if they have not.   Linsey: [phone]

## 2020-05-22 ENCOUNTER — VIRTUAL VISIT (OUTPATIENT)
Dept: OPHTHALMOLOGY | Facility: CLINIC | Age: 60
End: 2020-05-22
Attending: OPHTHALMOLOGY
Payer: MEDICARE

## 2020-05-22 DIAGNOSIS — H53.2 DIPLOPIA: Primary | ICD-10-CM

## 2020-06-17 NOTE — PROGRESS NOTES
"Linsey Mcdaniels is a 60 year old female who is being evaluated via a billable telephone visit.      The patient has been notified of following:     \"This telephone visit will be conducted via a call between you and your physician/provider. We have found that certain health care needs can be provided without the need for a physical exam.  This service lets us provide the care you need with a short phone conversation.  If a prescription is necessary we can send it directly to your pharmacy.  If lab work is needed we can place an order for that and you can then stop by our lab to have the test done at a later time.    Telephone visits are billed at different rates depending on your insurance coverage. During this emergency period, for some insurers they may be billed the same as an in-person visit.  Please reach out to your insurance provider with any questions.    If during the course of the call the physician/provider feels a telephone visit is not appropriate, you will not be charged for this service.\"    Patient has given verbal consent for Telephone visit?  Yes    What phone number would you like to be contacted at? Dora (Nurse at Nursing Home) [phone]    Phone call duration: 16 minutes (12:05-12:21 pm)    Called patient and discussed her symptoms of diplopia and dysphagia.  Patient has been quarantined in her nursing home and remains quarantined.  She reports continued difficulty with swallowing and had an ENT appointment set up but apparently due to the COVID pandemic did not follow through with it because of the nursing home being locked down.  Her diplopia persists and is essentially constant.  She has a very careful diet of soft foods and liquids to avoid choking.  When we reviewed her weight over hte last 1 month with nursing she had actually gained 1 lbs from 4/13 to 5/11/20. Patient using a cane to walk.  We discussed the limited options for her possible myasthenia gravis.  I offered her a trial " course of prednisone given the inabilty to obtain single fiber electromyography for her quarantined in the nursing home.  After a long back and forth discussion and the realization that prednisone could put her at heightened risk for severe infection from COVID19 if she were to contract.  After some thought the patient opted to continue as is for now without prednisone. She will make a follow-up appointment with me once she is no longer on lock down at her nursing home.      Noe Gonzales MD  , Neuro-Ophthalmology and Adult Strabismus Surgery  The Kenyn Higgins Chair in Neuro-Ophthalmology  Department of Ophthalmology and Visual Neurosciences  [facility]

## 2020-08-18 ENCOUNTER — TELEPHONE (OUTPATIENT)
Dept: OPHTHALMOLOGY | Facility: CLINIC | Age: 60
End: 2020-08-18

## 2020-08-18 NOTE — TELEPHONE ENCOUNTER
Spoke to pt at 0850  Pt ready to oral prednisone and following up previous mychart message yesterday after virtual visit    Pt has information for clinic    Name: Nurse Savita-- [phone]  Fax number-- [phone]    Pt using [facility] in [place], [place]    Note to Dr. Pickard and facilitator to assist in f/u plan for pt    Roman Merritt RN 8:58 AM 08/18/20

## 2020-08-21 ENCOUNTER — TELEPHONE (OUTPATIENT)
Dept: OPHTHALMOLOGY | Facility: CLINIC | Age: 60
End: 2020-08-21

## 2020-08-21 NOTE — TELEPHONE ENCOUNTER
----- Message from Noe Gonzales MD sent at 8/18/2020 10:46 PM CDT -----  Regarding: sfemg testing  Patricia,    Can we please see about this patient seeing Dr. Felton (spelling?) for BOTH single fiber electromyography and /or repetitive stim emg testing? Before we just asked for single fiber electromyography.    I replied to this patient's [employer] message saying I wanted to set-up the emg testing.  I'd like to do this STAT please.    Thank you. - Noe

## 2020-09-01 ENCOUNTER — TELEPHONE (OUTPATIENT)
Dept: OPHTHALMOLOGY | Facility: CLINIC | Age: 60
End: 2020-09-01

## 2020-09-01 NOTE — TELEPHONE ENCOUNTER
Patient has questions and updated information:   -Patient is going to stay with family in [place] - son picking up patient on Sept 12th. She will stay with them until 9/27th. If we need to reach her we should call Fer, her daughter. Not her cell which does not always work  - patient requesting we call Fer directly with EMG results as well

## 2020-09-15 ENCOUNTER — TRANSFERRED RECORDS (OUTPATIENT)
Dept: HEALTH INFORMATION MANAGEMENT | Facility: CLINIC | Age: 60
End: 2020-09-15

## 2020-09-15 ENCOUNTER — MYC MEDICAL ADVICE (OUTPATIENT)
Dept: OPHTHALMOLOGY | Facility: CLINIC | Age: 60
End: 2020-09-15

## 2020-09-21 DIAGNOSIS — G70.00 MG (MYASTHENIA GRAVIS) (H): Primary | ICD-10-CM

## 2020-09-22 DIAGNOSIS — G70.00 MG (MYASTHENIA GRAVIS) (H): Primary | ICD-10-CM

## 2020-09-22 RX ORDER — PREDNISONE 20 MG/1
20 TABLET ORAL DAILY
Qty: 90 TABLET | Refills: 4 | Status: SHIPPED | OUTPATIENT
Start: 2020-09-22 | End: 2021-03-21

## 2020-10-19 ENCOUNTER — MEDICAL CORRESPONDENCE (OUTPATIENT)
Dept: HEALTH INFORMATION MANAGEMENT | Facility: CLINIC | Age: 60
End: 2020-10-19

## 2020-10-19 ENCOUNTER — TELEPHONE (OUTPATIENT)
Dept: OPHTHALMOLOGY | Facility: CLINIC | Age: 60
End: 2020-10-19
Payer: MEDICARE

## 2020-10-19 NOTE — TELEPHONE ENCOUNTER
The patient called and asked for our fax number.  She also mentioned that she missed some return phone calls from the doctor after a MyChart inquiry.  Her nurse, Kandis Roth, from [facility] will be faxing a note to review the patients oral prednisone in light of her history of upper GI bleeds.

## 2020-10-28 ENCOUNTER — TRANSFERRED RECORDS (OUTPATIENT)
Dept: HEALTH INFORMATION MANAGEMENT | Facility: CLINIC | Age: 60
End: 2020-10-28

## 2020-11-08 ENCOUNTER — HEALTH MAINTENANCE LETTER (OUTPATIENT)
Age: 60
End: 2020-11-08

## 2020-11-18 ENCOUNTER — TELEPHONE (OUTPATIENT)
Dept: OPHTHALMOLOGY | Facility: CLINIC | Age: 60
End: 2020-11-18

## 2020-11-18 NOTE — TELEPHONE ENCOUNTER
Tried to call the patient multiple times to inquire more about her symptoms but she never answered. Left a voice message that we need to taper steroids and left a number for her to call us back

## 2021-03-27 ENCOUNTER — HEALTH MAINTENANCE LETTER (OUTPATIENT)
Age: 61
End: 2021-03-27

## 2021-09-11 ENCOUNTER — HEALTH MAINTENANCE LETTER (OUTPATIENT)
Age: 61
End: 2021-09-11

## 2021-11-06 ENCOUNTER — HEALTH MAINTENANCE LETTER (OUTPATIENT)
Age: 61
End: 2021-11-06

## 2022-04-23 ENCOUNTER — HEALTH MAINTENANCE LETTER (OUTPATIENT)
Age: 62
End: 2022-04-23

## 2022-10-29 ENCOUNTER — HEALTH MAINTENANCE LETTER (OUTPATIENT)
Age: 62
End: 2022-10-29

## 2023-06-01 ENCOUNTER — HEALTH MAINTENANCE LETTER (OUTPATIENT)
Age: 63
End: 2023-06-01